# Patient Record
Sex: FEMALE | Race: WHITE | NOT HISPANIC OR LATINO | Employment: UNEMPLOYED | URBAN - METROPOLITAN AREA
[De-identification: names, ages, dates, MRNs, and addresses within clinical notes are randomized per-mention and may not be internally consistent; named-entity substitution may affect disease eponyms.]

---

## 2019-04-23 ENCOUNTER — TELEPHONE (OUTPATIENT)
Dept: ENDOCRINOLOGY | Facility: CLINIC | Age: 29
End: 2019-04-23

## 2019-05-02 ENCOUNTER — OFFICE VISIT (OUTPATIENT)
Dept: CARDIOLOGY | Facility: CLINIC | Age: 29
End: 2019-05-02
Payer: OTHER GOVERNMENT

## 2019-05-02 VITALS
WEIGHT: 227.06 LBS | DIASTOLIC BLOOD PRESSURE: 68 MMHG | RESPIRATION RATE: 16 BRPM | SYSTOLIC BLOOD PRESSURE: 102 MMHG | HEART RATE: 109 BPM | OXYGEN SATURATION: 98 %

## 2019-05-02 DIAGNOSIS — R00.2 PALPITATIONS: ICD-10-CM

## 2019-05-02 DIAGNOSIS — R00.0 TACHYCARDIA: Primary | ICD-10-CM

## 2019-05-02 DIAGNOSIS — E66.01 CLASS 2 SEVERE OBESITY DUE TO EXCESS CALORIES WITH SERIOUS COMORBIDITY IN ADULT, UNSPECIFIED BMI: ICD-10-CM

## 2019-05-02 DIAGNOSIS — Z71.9 ENCOUNTER FOR CONSULTATION: ICD-10-CM

## 2019-05-02 PROCEDURE — 99204 OFFICE O/P NEW MOD 45 MIN: CPT | Mod: S$PBB,,, | Performed by: INTERNAL MEDICINE

## 2019-05-02 PROCEDURE — 93010 EKG 12-LEAD: ICD-10-PCS | Mod: S$PBB,,, | Performed by: INTERNAL MEDICINE

## 2019-05-02 PROCEDURE — 93005 ELECTROCARDIOGRAM TRACING: CPT | Mod: PBBFAC,PO | Performed by: INTERNAL MEDICINE

## 2019-05-02 PROCEDURE — 99999 PR PBB SHADOW E&M-NEW PATIENT-LVL III: CPT | Mod: PBBFAC,,, | Performed by: INTERNAL MEDICINE

## 2019-05-02 PROCEDURE — 99999 PR PBB SHADOW E&M-NEW PATIENT-LVL III: ICD-10-PCS | Mod: PBBFAC,,, | Performed by: INTERNAL MEDICINE

## 2019-05-02 PROCEDURE — 93010 ELECTROCARDIOGRAM REPORT: CPT | Mod: S$PBB,,, | Performed by: INTERNAL MEDICINE

## 2019-05-02 PROCEDURE — 99204 PR OFFICE/OUTPT VISIT, NEW, LEVL IV, 45-59 MIN: ICD-10-PCS | Mod: S$PBB,,, | Performed by: INTERNAL MEDICINE

## 2019-05-02 PROCEDURE — 99203 OFFICE O/P NEW LOW 30 MIN: CPT | Mod: PBBFAC,PO | Performed by: INTERNAL MEDICINE

## 2019-05-02 RX ORDER — METOPROLOL TARTRATE 50 MG/1
50 TABLET ORAL 2 TIMES DAILY
COMMUNITY
End: 2019-06-17 | Stop reason: SDUPTHER

## 2019-05-02 NOTE — PROGRESS NOTES
Subjective:    Patient ID:  Beth Alex is a 28 y.o. female who presents for evaluation of Consult (2nd opinion)      HPI  Patient is here for evaluation tachycardia.  [facility] and underwent treadmill and Holter which were fairly unremarkable.  She was later started on by primary with metoprolol when she is improved for tachycardia little bit.  She has also been on Wellbutrin for a long time which we discussed as adverse effect of tachycardia as well.  She has not tried any in any alternative medications.  She denies any PND, orthopnea or lower extremity edema.  She was restricted by primary care for exercising has gained some weight.  She has had no sustained tachycardia the point where she developed dizziness, presyncope or syncope.  She says she avoids alcohol, caffeine or any other stimulants on questioning    Review of Systems   Constitution: Negative.   HENT: Negative.    Eyes: Negative.    Cardiovascular: Positive for irregular heartbeat and palpitations. Negative for chest pain, dyspnea on exertion, leg swelling, near-syncope, orthopnea, paroxysmal nocturnal dyspnea and syncope.   Respiratory: Negative for shortness of breath.    Skin: Negative.    Musculoskeletal: Negative.    Gastrointestinal: Negative for abdominal pain, constipation and diarrhea.   Genitourinary: Negative for dysuria.   Neurological: Negative.    Psychiatric/Behavioral: Negative.    History reviewed. No pertinent past medical history.   History reviewed. No pertinent surgical history.   Social History     Tobacco Use    Smoking status: Never Smoker    Smokeless tobacco: Never Used   Substance Use Topics    Alcohol use: Not on file    Drug use: Not on file   History reviewed. No pertinent family history.     Objective:    Physical Exam   Constitutional: She is oriented to person, place, and time. She appears well-developed and well-nourished.   HENT:   Head: Normocephalic and atraumatic.    Eyes: Pupils are equal, round, and reactive to light. Conjunctivae and EOM are normal.   Neck: Normal range of motion. Neck supple. No thyromegaly present.   Cardiovascular: Normal rate and regular rhythm.   No murmur heard.  Pulmonary/Chest: Effort normal and breath sounds normal. No respiratory distress.   Abdominal: Soft. Bowel sounds are normal.   Musculoskeletal: She exhibits no edema.   Neurological: She is alert and oriented to person, place, and time.   Skin: Skin is warm and dry.   Psychiatric: She has a normal mood and affect. Her behavior is normal.       ekg normal sinus rhythm    Assessment:       1. Tachycardia    2. Palpitations    3. Class 2 severe obesity due to excess calories with serious comorbidity in adult, unspecified BMI         Plan:       -continue current medical therapy  -records reviewed from [facility] including Holter and treadmill within normal limits  -check echo  -avoidance of stimulants I caffeine etc.  -encouraged symptom limited exercise  -follow up with Endocrine  -would consider alternative medicine toe Wellbutrin with possible adverse reaction with tachycardia    Return to clinic in 1 month with testing now

## 2019-05-16 ENCOUNTER — HOSPITAL ENCOUNTER (OUTPATIENT)
Dept: CARDIOLOGY | Facility: HOSPITAL | Age: 29
Discharge: HOME | End: 2019-05-16
Attending: INTERNAL MEDICINE
Payer: OTHER GOVERNMENT

## 2019-05-16 VITALS — WEIGHT: 221 LBS | HEIGHT: 62 IN | BODY MASS INDEX: 40.67 KG/M2

## 2019-05-16 DIAGNOSIS — R00.0 TACHYCARDIA: ICD-10-CM

## 2019-05-16 LAB
AORTIC ROOT ANNULUS: 2.85 CM
AORTIC VALVE CUSP SEPERATION: 2.09 CM
ASCENDING AORTA: 2.28 CM
AV INDEX (PROSTH): 0.92
AV MEAN GRADIENT: 5.29 MMHG
AV PEAK GRADIENT: 8.64 MMHG
AV VALVE AREA: 2.42 CM2
AV VELOCITY RATIO: 1.01
BSA FOR ECHO PROCEDURE: 2.09 M2
CV ECHO LV RWT: 0.3 CM
DOP CALC AO PEAK VEL: 1.47 M/S
DOP CALC AO VTI: 29.85 CM
DOP CALC LVOT AREA: 2.63 CM2
DOP CALC LVOT DIAMETER: 1.83 CM
DOP CALC LVOT PEAK VEL: 1.48 M/S
DOP CALC LVOT STROKE VOLUME: 72.29 CM3
DOP CALCLVOT PEAK VEL VTI: 27.5 CM
E WAVE DECELERATION TIME: 207.15 MSEC
E/A RATIO: 1.37
E/E' RATIO: 6.37
ECHO LV POSTERIOR WALL: 0.76 CM (ref 0.6–1.1)
FRACTIONAL SHORTENING: 38 % (ref 28–44)
INTERVENTRICULAR SEPTUM: 0.78 CM (ref 0.6–1.1)
IVRT: 0.1 MSEC
LA MAJOR: 4.95 CM
LA MINOR: 5.12 CM
LA WIDTH: 3.5 CM
LEFT ATRIUM SIZE: 3.81 CM
LEFT ATRIUM VOLUME INDEX: 28.6 ML/M2
LEFT ATRIUM VOLUME: 57.05 CM3
LEFT INTERNAL DIMENSION IN SYSTOLE: 3.2 CM (ref 2.1–4)
LEFT VENTRICLE DIASTOLIC VOLUME INDEX: 62.6 ML/M2
LEFT VENTRICLE DIASTOLIC VOLUME: 124.84 ML
LEFT VENTRICLE MASS INDEX: 67.5 G/M2
LEFT VENTRICLE SYSTOLIC VOLUME INDEX: 20.5 ML/M2
LEFT VENTRICLE SYSTOLIC VOLUME: 40.83 ML
LEFT VENTRICULAR INTERNAL DIMENSION IN DIASTOLE: 5.12 CM (ref 3.5–6)
LEFT VENTRICULAR MASS: 134.71 G
LV LATERAL E/E' RATIO: 5.38
LV SEPTAL E/E' RATIO: 7.82
MV PEAK A VEL: 0.63 M/S
MV PEAK E VEL: 0.86 M/S
PISA TR MAX VEL: 1.51 M/S
PULM VEIN S/D RATIO: 1.09
PV PEAK D VEL: 0.57 M/S
PV PEAK S VEL: 0.62 M/S
PV PEAK VELOCITY: 1.08 CM/S
RA MAJOR: 4.67 CM
RA PRESSURE: 3 MMHG
RA WIDTH: 3.25 CM
RIGHT VENTRICULAR END-DIASTOLIC DIMENSION: 2.29 CM
RV TISSUE DOPPLER FREE WALL SYSTOLIC VELOCITY 1 (APICAL 4 CHAMBER VIEW): 14.26 M/S
SINUS: 2.49 CM
STJ: 1.94 CM
TDI LATERAL: 0.16
TDI SEPTAL: 0.11
TDI: 0.14
TR MAX PG: 9.12 MMHG
TRICUSPID ANNULAR PLANE SYSTOLIC EXCURSION: 3.28 CM
TV REST PULMONARY ARTERY PRESSURE: 12 MMHG

## 2019-05-16 PROCEDURE — 93306 TTE W/DOPPLER COMPLETE: CPT | Mod: 26,,, | Performed by: INTERNAL MEDICINE

## 2019-05-16 PROCEDURE — 93306 TTE W/DOPPLER COMPLETE: CPT

## 2019-05-16 PROCEDURE — 93306 TRANSTHORACIC ECHO (TTE) COMPLETE (CUPID ONLY): ICD-10-PCS | Mod: 26,,, | Performed by: INTERNAL MEDICINE

## 2019-06-03 ENCOUNTER — OFFICE VISIT (OUTPATIENT)
Dept: CARDIOLOGY | Facility: CLINIC | Age: 29
End: 2019-06-03
Payer: OTHER GOVERNMENT

## 2019-06-03 VITALS
OXYGEN SATURATION: 98 % | SYSTOLIC BLOOD PRESSURE: 112 MMHG | BODY MASS INDEX: 42.34 KG/M2 | HEART RATE: 86 BPM | RESPIRATION RATE: 16 BRPM | WEIGHT: 231.5 LBS | DIASTOLIC BLOOD PRESSURE: 72 MMHG

## 2019-06-03 DIAGNOSIS — R00.2 PALPITATIONS: ICD-10-CM

## 2019-06-03 DIAGNOSIS — E66.01 CLASS 2 SEVERE OBESITY DUE TO EXCESS CALORIES WITH SERIOUS COMORBIDITY IN ADULT, UNSPECIFIED BMI: ICD-10-CM

## 2019-06-03 DIAGNOSIS — Z71.9 ENCOUNTER FOR CONSULTATION: ICD-10-CM

## 2019-06-03 DIAGNOSIS — R00.0 TACHYCARDIA: Primary | ICD-10-CM

## 2019-06-03 PROCEDURE — 99214 OFFICE O/P EST MOD 30 MIN: CPT | Mod: S$PBB,,, | Performed by: INTERNAL MEDICINE

## 2019-06-03 PROCEDURE — 99214 PR OFFICE/OUTPT VISIT, EST, LEVL IV, 30-39 MIN: ICD-10-PCS | Mod: S$PBB,,, | Performed by: INTERNAL MEDICINE

## 2019-06-03 PROCEDURE — 99999 PR PBB SHADOW E&M-EST. PATIENT-LVL III: CPT | Mod: PBBFAC,,, | Performed by: INTERNAL MEDICINE

## 2019-06-03 PROCEDURE — 99999 PR PBB SHADOW E&M-EST. PATIENT-LVL III: ICD-10-PCS | Mod: PBBFAC,,, | Performed by: INTERNAL MEDICINE

## 2019-06-03 PROCEDURE — 99213 OFFICE O/P EST LOW 20 MIN: CPT | Mod: PBBFAC | Performed by: INTERNAL MEDICINE

## 2019-06-03 NOTE — PROGRESS NOTES
Subjective:    Patient ID:  Beth Alex is a 28 y.o. female who presents for evaluation of No chief complaint on file.      HPI    Previous history:  Patient is here for evaluation tachycardia.  She presents seen by the [facility] last year and underwent treadmill and Holter which were fairly unremarkable.  She was later started on by primary with metoprolol when she is improved for tachycardia little bit.  She has also been on Wellbutrin for a long time which we discussed as adverse effect of tachycardia as well.  She has not tried any in any alternative medications.  She denies any PND, orthopnea or lower extremity edema.  She was restricted by primary care for exercising has gained some weight.  She has had no sustained tachycardia the point where she developed dizziness, presyncope or syncope.  She says she avoids alcohol, caffeine or any other stimulants on questioning    Today:  Here for follow-up of evaluation of tachycardia.  She denies any worsening cardiopulmonary complaints since we last saw her.  We mainly discussed health maintenance in terms of exercise and avoidance of stimulants.  She denies any PND, orthopnea or lower extremity edema.  She has experiencing dizziness, presyncope or syncope.  She underwent diagnostic testing as below which was within normal limits.  We mainly provided reassurance today.      Review of Systems   Constitution: Negative.   HENT: Negative.    Eyes: Negative.    Cardiovascular: Positive for irregular heartbeat and palpitations. Negative for chest pain, dyspnea on exertion, leg swelling, near-syncope, orthopnea, paroxysmal nocturnal dyspnea and syncope.   Respiratory: Negative for shortness of breath.    Skin: Negative.    Musculoskeletal: Negative.    Gastrointestinal: Negative for abdominal pain, constipation and diarrhea.   Genitourinary: Negative for dysuria.   Neurological: Negative.    Psychiatric/Behavioral: Negative.    History reviewed. No pertinent past  medical history.   History reviewed. No pertinent surgical history.   Social History     Tobacco Use    Smoking status: Never Smoker    Smokeless tobacco: Never Used   Substance Use Topics    Alcohol use: Not on file    Drug use: Not on file   History reviewed. No pertinent family history.     Objective:    Physical Exam   Constitutional: She is oriented to person, place, and time. She appears well-developed and well-nourished.   HENT:   Head: Normocephalic and atraumatic.   Eyes: Pupils are equal, round, and reactive to light. Conjunctivae and EOM are normal.   Neck: Normal range of motion. Neck supple. No thyromegaly present.   Cardiovascular: Normal rate and regular rhythm.   No murmur heard.  Pulmonary/Chest: Effort normal and breath sounds normal. No respiratory distress.   Abdominal: Soft. Bowel sounds are normal.   Musculoskeletal: She exhibits no edema.   Neurological: She is alert and oriented to person, place, and time.   Skin: Skin is warm and dry.   Psychiatric: She has a normal mood and affect. Her behavior is normal.       ekg normal sinus rhythm    Echo:  5-19  · Normal left ventricular systolic function. The estimated ejection fraction is 55%  · Normal LV diastolic function.  · Normal right ventricular systolic function.  Assessment:       1. Tachycardia    2. Palpitations    3. Class 2 severe obesity due to excess calories with serious comorbidity in adult, unspecified BMI    4. Encounter for consultation         Plan:       -continue current medical therapy  -records reviewed from [facility] including Holter and treadmill within normal limits  -avoidance of stimulants I caffeine etc.  -encouraged symptom limited exercise  -follow up with Endocrine  -would consider alternative medicine to Wellbutrin with possible adverse reaction with tachycardia    Return to clinic in 6 months

## 2019-06-17 RX ORDER — METOPROLOL TARTRATE 50 MG/1
50 TABLET ORAL 2 TIMES DAILY
Qty: 60 TABLET | Refills: 6 | Status: SHIPPED | OUTPATIENT
Start: 2019-06-17

## 2019-07-19 DIAGNOSIS — G44.85 STABBING HEADACHE: Primary | ICD-10-CM

## 2019-08-01 ENCOUNTER — TELEPHONE (OUTPATIENT)
Dept: RADIOLOGY | Facility: HOSPITAL | Age: 29
End: 2019-08-01

## 2019-08-02 ENCOUNTER — TELEPHONE (OUTPATIENT)
Dept: RADIOLOGY | Facility: HOSPITAL | Age: 29
End: 2019-08-02

## 2019-08-05 ENCOUNTER — HOSPITAL ENCOUNTER (OUTPATIENT)
Dept: RADIOLOGY | Facility: HOSPITAL | Age: 29
Discharge: HOME | End: 2019-08-05
Attending: PSYCHIATRY & NEUROLOGY
Payer: OTHER GOVERNMENT

## 2019-08-05 DIAGNOSIS — G44.85 STABBING HEADACHE: ICD-10-CM

## 2019-08-05 PROCEDURE — 70553 MRI BRAIN STEM W/O & W/DYE: CPT | Mod: 26,,, | Performed by: RADIOLOGY

## 2019-08-05 PROCEDURE — 25500020 PHARM REV CODE 255: Performed by: PSYCHIATRY & NEUROLOGY

## 2019-08-05 PROCEDURE — A9585 GADOBUTROL INJECTION: HCPCS | Performed by: PSYCHIATRY & NEUROLOGY

## 2019-08-05 PROCEDURE — 70553 MRI BRAIN STEM W/O & W/DYE: CPT | Mod: TC

## 2019-08-05 PROCEDURE — 70553 MRI BRAIN W WO CONTRAST: ICD-10-PCS | Mod: 26,,, | Performed by: RADIOLOGY

## 2019-08-05 RX ORDER — GADOBUTROL 604.72 MG/ML
10 INJECTION INTRAVENOUS
Status: COMPLETED | OUTPATIENT
Start: 2019-08-05 | End: 2019-08-05

## 2019-08-05 RX ADMIN — GADOBUTROL 10 ML: 604.72 INJECTION INTRAVENOUS at 03:08

## 2019-10-07 ENCOUNTER — HOSPITAL ENCOUNTER (EMERGENCY)
Facility: HOSPITAL | Age: 29
Discharge: HOME | End: 2019-10-07
Attending: EMERGENCY MEDICINE
Payer: OTHER GOVERNMENT

## 2019-10-07 VITALS
TEMPERATURE: 98 F | SYSTOLIC BLOOD PRESSURE: 135 MMHG | HEIGHT: 63 IN | OXYGEN SATURATION: 97 % | DIASTOLIC BLOOD PRESSURE: 93 MMHG | HEART RATE: 103 BPM | BODY MASS INDEX: 43.41 KG/M2 | WEIGHT: 245 LBS | RESPIRATION RATE: 18 BRPM

## 2019-10-07 DIAGNOSIS — R22.0 FACIAL SWELLING: Primary | ICD-10-CM

## 2019-10-07 LAB
B-HCG UR QL: NEGATIVE
CTP QC/QA: YES

## 2019-10-07 PROCEDURE — 81025 URINE PREGNANCY TEST: CPT | Performed by: NURSE PRACTITIONER

## 2019-10-07 PROCEDURE — 99283 EMERGENCY DEPT VISIT LOW MDM: CPT | Mod: 25

## 2019-10-07 RX ORDER — OXYCODONE AND ACETAMINOPHEN 5; 325 MG/1; MG/1
1 TABLET ORAL EVERY 6 HOURS PRN
Qty: 12 TABLET | Refills: 0 | Status: SHIPPED | OUTPATIENT
Start: 2019-10-07

## 2019-10-08 NOTE — ED TRIAGE NOTES
Patient came to the ED with complaint of right side facial swelling from and allergic reaction from cleaning her garage.

## 2019-10-08 NOTE — DISCHARGE INSTRUCTIONS
Continue with your antibiotics.  Continue with ibuprofen as needed for discomfort.  Percocet for severe or persistent pain.    Follow-up with dentist or with your primary care provider later this week for re-evaluation.  Please return to this emergency department if you begin with fever, if swelling and pain worsen despite treatment, if any other problems occur.

## 2019-10-08 NOTE — ED PROVIDER NOTES
"Encounter Date: 10/7/2019       History     Chief Complaint   Patient presents with    Facial Pain     reports 2 days ago swelling to right side of nose, this morning right eye swollen shut; urgent care told her it was her tooth but she denies dental pain; pain is to right nose radiating up head and back of neck; denies throat issues; also "weird bug bite on my arm"     29-year-old female history of tachycardia presents to ED complaining of facial swelling x2 days.  She denies any dental pain, denies trauma, denies fever, no neck pain or stiffness, no rash, no nasal pain, no ocular pain, no headache. Patient presented to urgent care today, started on amoxicillin for presumed odontogenic infection.  She has taken 1 dose of amoxicillin today.  Presents to ED due to worsening pain. No history of significant allergies, no neck swelling, no shortness of breath, no other complaints. Symptoms are acute, constant, severity 5/10.  No alleviating factors.  Pain exacerbated by palpation. No radiation of pain.        Review of patient's allergies indicates:  No Known Allergies  Past Medical History:   Diagnosis Date    Tachycardia      History reviewed. No pertinent surgical history.  History reviewed. No pertinent family history.  Social History     Tobacco Use    Smoking status: Never Smoker    Smokeless tobacco: Never Used   Substance Use Topics    Alcohol use: Never     Frequency: Never    Drug use: Never     Review of Systems   Constitutional: Negative for chills and fever.   HENT: Positive for facial swelling. Negative for congestion, dental problem, ear discharge, ear pain, rhinorrhea, sore throat and trouble swallowing.    Eyes: Negative.  Negative for discharge and redness.   Respiratory: Negative for cough, chest tightness and shortness of breath.    Cardiovascular: Negative for chest pain.   Gastrointestinal: Negative for abdominal pain, nausea and vomiting.   Endocrine: Negative.    Genitourinary: Negative for " dysuria, flank pain, frequency, hematuria, pelvic pain, vaginal bleeding, vaginal discharge and vaginal pain.   Musculoskeletal: Negative for back pain, myalgias, neck pain and neck stiffness.   Skin: Negative for rash.   Neurological: Negative for dizziness, weakness, light-headedness and headaches.   Hematological: Does not bruise/bleed easily.   Psychiatric/Behavioral: Negative.    All other systems reviewed and are negative.      Physical Exam     Initial Vitals [10/07/19 1936]   BP Pulse Resp Temp SpO2   129/81 107 18 99 °F (37.2 °C) 98 %      MAP       --         Physical Exam    Nursing note and vitals reviewed.  Constitutional: She appears well-developed and well-nourished. She is not diaphoretic. No distress.   HENT:   Head: Normocephalic and atraumatic.   R maxillary induration and swelling without palpable fluctuance. No overlying skin changes.  No open wound.  Area is exquisitely tender.    Multiple missing upper teeth, denture in place.  No tenderness to percussion of any teeth. Tooth #7 is cracked and eroded. No obvious intraoral abscess.  No airway edema. Airway is patent without stridor.    No cervical lymphadenopathy.  Neck is supple. No trismus.   Eyes: Conjunctivae and EOM are normal. Pupils are equal, round, and reactive to light.   Neck: Normal range of motion. Neck supple. No tracheal deviation present.   No nuchal rigidity.   Cardiovascular: Intact distal pulses.   Pulmonary/Chest: No stridor. No respiratory distress.   Musculoskeletal: Normal range of motion. She exhibits no tenderness.   Lymphadenopathy:     She has no cervical adenopathy.   Neurological: She is alert and oriented to person, place, and time. GCS score is 15. GCS eye subscore is 4. GCS verbal subscore is 5. GCS motor subscore is 6.   Skin: Skin is warm and dry. Capillary refill takes less than 2 seconds.   Psychiatric: She has a normal mood and affect. Her behavior is normal. Judgment and thought content normal.         ED  Course   Procedures  Labs Reviewed   POCT URINE PREGNANCY          Imaging Results    None          Medical Decision Making:   Differential Diagnosis:   Dental abscess, facial cellulitis, VZV infection  ED Management:  I do think this could be dental in origin given poor dentition, despite no dental pain. She just started antibiotics today.  I think we should give these antibiotics some time.  She is tachycardic, however has a history of tachycardia, on metoprolol.  She is afebrile.  There is no obvious abscess.  There is no cervical lymphadenopathy or nuchal rigidity.  I do not suspect systemic infection at this time.  I do not feel need for further intervention.  Pain control, PCP or dental follow-up, return precautions given.                      Clinical Impression:       ICD-10-CM ICD-9-CM   1. Facial swelling R22.0 784.2         Disposition:   Disposition: Discharged  Condition: Stable                        Cristo Zuñiga PA-C  10/07/19 [id number]